# Patient Record
Sex: FEMALE | URBAN - METROPOLITAN AREA
[De-identification: names, ages, dates, MRNs, and addresses within clinical notes are randomized per-mention and may not be internally consistent; named-entity substitution may affect disease eponyms.]

---

## 2019-02-26 ENCOUNTER — Encounter (OUTPATIENT)
Dept: URBAN - METROPOLITAN AREA CLINIC 71 | Facility: CLINIC | Age: 69
End: 2019-02-26
Payer: MEDICARE

## 2019-02-26 PROCEDURE — 92250 FUNDUS PHOTOGRAPHY W/I&R: CPT | Performed by: OPTOMETRIST

## 2019-02-26 PROCEDURE — 99213 OFFICE O/P EST LOW 20 MIN: CPT | Performed by: OPTOMETRIST

## 2019-02-26 PROCEDURE — 92081 LIMITED VISUAL FIELD XM: CPT | Performed by: OPTOMETRIST

## 2020-01-15 ENCOUNTER — Encounter (OUTPATIENT)
Dept: URBAN - METROPOLITAN AREA CLINIC 71 | Facility: CLINIC | Age: 70
End: 2020-01-15
Payer: MEDICARE

## 2020-01-15 PROCEDURE — 99214 OFFICE O/P EST MOD 30 MIN: CPT | Performed by: OPTOMETRIST

## 2020-01-29 ENCOUNTER — Encounter (OUTPATIENT)
Dept: URBAN - METROPOLITAN AREA CLINIC 71 | Facility: CLINIC | Age: 70
End: 2020-01-29

## 2020-01-29 PROCEDURE — 92015 DETERMINE REFRACTIVE STATE: CPT | Performed by: OPTOMETRIST

## 2021-07-19 ENCOUNTER — OFFICE VISIT (OUTPATIENT)
Dept: URBAN - METROPOLITAN AREA CLINIC 71 | Facility: CLINIC | Age: 71
End: 2021-07-19
Payer: MEDICARE

## 2021-07-19 DIAGNOSIS — H53.19 OTHER SUBJECTIVE VISUAL DISTURBANCES: ICD-10-CM

## 2021-07-19 DIAGNOSIS — Z96.1 PRESENCE OF INTRAOCULAR LENS: ICD-10-CM

## 2021-07-19 DIAGNOSIS — H43.813 VITREOUS DEGENERATION, BILATERAL: Primary | ICD-10-CM

## 2021-07-19 DIAGNOSIS — H26.491 OTHER SECONDARY CATARACT, RIGHT EYE: ICD-10-CM

## 2021-07-19 PROCEDURE — 92014 COMPRE OPH EXAM EST PT 1/>: CPT | Performed by: OPTOMETRIST

## 2021-07-19 ASSESSMENT — INTRAOCULAR PRESSURE
OS: 10
OD: 12

## 2021-07-19 NOTE — IMPRESSION/PLAN
Impression: Other subjective visual disturbances: H53.19. Head injury 2 weeks ago w/2 days of dilated OD, resolved. Plan: Discussed in detail with pt. Discussed possible need for neurology consult if any new neurological symptoms noticed. Continue to monitor. Call with concerns.

## 2021-10-29 ENCOUNTER — OFFICE VISIT (OUTPATIENT)
Dept: URBAN - METROPOLITAN AREA CLINIC 71 | Facility: CLINIC | Age: 71
End: 2021-10-29
Payer: COMMERCIAL

## 2021-10-29 DIAGNOSIS — H04.123 DRY EYE SYNDROME OF BILATERAL LACRIMAL GLANDS: ICD-10-CM

## 2021-10-29 DIAGNOSIS — H52.13 MYOPIA, BILATERAL: ICD-10-CM

## 2021-10-29 DIAGNOSIS — H52.4 PRESBYOPIA: Primary | ICD-10-CM

## 2021-10-29 DIAGNOSIS — H52.223 REGULAR ASTIGMATISM, BILATERAL: ICD-10-CM

## 2021-10-29 PROCEDURE — 92014 COMPRE OPH EXAM EST PT 1/>: CPT

## 2021-10-29 ASSESSMENT — INTRAOCULAR PRESSURE
OD: 9
OS: 10

## 2021-10-29 ASSESSMENT — VISUAL ACUITY
OD: 20/25
OS: 20/20

## 2021-10-29 NOTE — IMPRESSION/PLAN
Impression: Presbyopia: H52.4. Unreliable refraction today OD only. Plan: Patient educated on today's findings. Patient given finalized glasses prescription. Patient to RTC if having any issues w/ new glasses.

## 2021-10-29 NOTE — IMPRESSION/PLAN
Impression: Dry eye syndrome of bilateral lacrimal glands: H04.123. Plan: Patient educated on today's findings. Patient advised to use OTC ATs up to QID OU but especially OD d/t intermittent blurry fluctuation in vision.

## 2022-09-16 ENCOUNTER — OFFICE VISIT (OUTPATIENT)
Dept: URBAN - METROPOLITAN AREA CLINIC 71 | Facility: CLINIC | Age: 72
End: 2022-09-16
Payer: MEDICARE

## 2022-09-16 DIAGNOSIS — Z96.1 PRESENCE OF INTRAOCULAR LENS: ICD-10-CM

## 2022-09-16 DIAGNOSIS — H52.4 PRESBYOPIA: ICD-10-CM

## 2022-09-16 DIAGNOSIS — H04.123 DRY EYE SYNDROME OF BILATERAL LACRIMAL GLANDS: ICD-10-CM

## 2022-09-16 DIAGNOSIS — H43.813 VITREOUS DEGENERATION, BILATERAL: Primary | ICD-10-CM

## 2022-09-16 PROCEDURE — 99213 OFFICE O/P EST LOW 20 MIN: CPT

## 2022-09-16 RX ORDER — FLUOROMETHOLONE 1 MG/ML
0.1 % SOLUTION/ DROPS OPHTHALMIC
Qty: 5 | Refills: 0 | Status: ACTIVE
Start: 2022-09-16

## 2022-09-16 ASSESSMENT — VISUAL ACUITY
OD: 20/20
OS: 20/20

## 2022-09-16 ASSESSMENT — INTRAOCULAR PRESSURE
OS: 11
OD: 9

## 2022-09-16 NOTE — IMPRESSION/PLAN
Impression: Dry eye syndrome of bilateral lacrimal glands: H04.123. Plan: Recommend patient continue with AFTs 2-4x daily and will have patient start [id number] [facility] gtts BID OU to help with symptoms. Patient to call if symptoms do not improve.

## 2022-09-16 NOTE — IMPRESSION/PLAN
Impression: Presbyopia: H52.4. - Stressed change in Rx since last exam. Pt voices understanding. Plan: Dispensed new glasses Rx today and discussed changes and possible adaptation period. Patient to call if any issues with new glasses occur.

## 2022-09-16 NOTE — IMPRESSION/PLAN
Impression: Vitreous degeneration, bilateral: H43.813. - Optos preformed: 09/16/2022. Stable. Plan: Will continue to monitor. All signs and risks of retinal detachment and tears were discussed in detail. Patient instructed to call the office immediately if any symptoms noted.

## 2023-09-01 ENCOUNTER — OFFICE VISIT (OUTPATIENT)
Dept: URBAN - METROPOLITAN AREA CLINIC 71 | Facility: CLINIC | Age: 73
End: 2023-09-01
Payer: MEDICARE

## 2023-09-01 DIAGNOSIS — H04.123 DRY EYE SYNDROME OF BILATERAL LACRIMAL GLANDS: ICD-10-CM

## 2023-09-01 DIAGNOSIS — H43.813 VITREOUS DEGENERATION, BILATERAL: Primary | ICD-10-CM

## 2023-09-01 DIAGNOSIS — Z96.1 PRESENCE OF INTRAOCULAR LENS: ICD-10-CM

## 2023-09-01 PROCEDURE — 99213 OFFICE O/P EST LOW 20 MIN: CPT

## 2023-09-01 ASSESSMENT — INTRAOCULAR PRESSURE
OS: 10
OD: 10

## 2023-09-09 ENCOUNTER — TESTING ONLY (OUTPATIENT)
Dept: URBAN - METROPOLITAN AREA CLINIC 71 | Facility: CLINIC | Age: 73
End: 2023-09-09
Payer: MEDICARE

## 2023-09-09 DIAGNOSIS — H52.223 REGULAR ASTIGMATISM, BILATERAL: Primary | ICD-10-CM

## 2023-09-09 DIAGNOSIS — H52.4 PRESBYOPIA: ICD-10-CM

## 2023-09-09 RX ORDER — FLUOROMETHOLONE 1 MG/ML
0.1 % SOLUTION/ DROPS OPHTHALMIC
Qty: 5 | Refills: 0 | Status: ACTIVE
Start: 2023-09-09

## 2023-09-09 ASSESSMENT — INTRAOCULAR PRESSURE
OD: 9
OS: 10

## 2023-09-09 ASSESSMENT — VISUAL ACUITY
OD: 20/20
OS: 20/20

## 2024-05-29 ENCOUNTER — OFFICE VISIT (OUTPATIENT)
Dept: URBAN - METROPOLITAN AREA CLINIC 72 | Facility: CLINIC | Age: 74
End: 2024-05-29
Payer: MEDICARE

## 2024-05-29 DIAGNOSIS — S05.02XA CORNEAL ABRASION W/O FB OF LEFT EYE, INITIAL ENCOUNTER: Primary | ICD-10-CM

## 2024-05-29 PROCEDURE — 92071 CONTACT LENS FITTING FOR TX: CPT

## 2024-05-29 PROCEDURE — 99212 OFFICE O/P EST SF 10 MIN: CPT

## 2024-05-29 ASSESSMENT — INTRAOCULAR PRESSURE
OD: 13
OS: 13

## 2024-06-01 ENCOUNTER — OFFICE VISIT (OUTPATIENT)
Dept: URBAN - METROPOLITAN AREA CLINIC 71 | Facility: CLINIC | Age: 74
End: 2024-06-01
Payer: MEDICARE

## 2024-06-01 DIAGNOSIS — S05.02XS CORNEAL ABRASION W/O FB OF LEFT EYE, SEQUELA: Primary | ICD-10-CM

## 2024-06-01 PROCEDURE — 99213 OFFICE O/P EST LOW 20 MIN: CPT

## 2024-11-07 ENCOUNTER — OFFICE VISIT (OUTPATIENT)
Dept: URBAN - METROPOLITAN AREA CLINIC 71 | Facility: CLINIC | Age: 74
End: 2024-11-07
Payer: MEDICARE

## 2024-11-07 DIAGNOSIS — H43.813 VITREOUS DEGENERATION, BILATERAL: Primary | ICD-10-CM

## 2024-11-07 DIAGNOSIS — H04.123 DRY EYE SYNDROME OF BILATERAL LACRIMAL GLANDS: ICD-10-CM

## 2024-11-07 DIAGNOSIS — Z96.1 PRESENCE OF INTRAOCULAR LENS: ICD-10-CM

## 2024-11-07 PROCEDURE — 92134 CPTRZ OPH DX IMG PST SGM RTA: CPT

## 2024-11-07 PROCEDURE — 99213 OFFICE O/P EST LOW 20 MIN: CPT

## 2024-11-07 RX ORDER — PREDNISOLONE ACETATE 10 MG/ML
1 % SUSPENSION/ DROPS OPHTHALMIC
Qty: 5 | Refills: 0 | Status: ACTIVE
Start: 2024-11-07

## 2024-11-07 ASSESSMENT — INTRAOCULAR PRESSURE
OS: 11
OD: 10

## 2024-11-07 ASSESSMENT — VISUAL ACUITY
OD: 20/25
OS: 20/20

## 2024-11-25 ENCOUNTER — OFFICE VISIT (OUTPATIENT)
Dept: URBAN - METROPOLITAN AREA CLINIC 71 | Facility: CLINIC | Age: 74
End: 2024-11-25
Payer: MEDICARE

## 2024-11-25 DIAGNOSIS — H04.123 DRY EYE SYNDROME OF BILATERAL LACRIMAL GLANDS: Primary | ICD-10-CM

## 2024-11-25 PROCEDURE — 99213 OFFICE O/P EST LOW 20 MIN: CPT

## 2024-11-25 RX ORDER — PREDNISOLONE ACETATE 10 MG/ML
1 % SUSPENSION/ DROPS OPHTHALMIC
Qty: 5 | Refills: 1 | Status: ACTIVE
Start: 2024-11-25

## 2024-11-25 ASSESSMENT — INTRAOCULAR PRESSURE
OD: 9
OS: 10

## 2025-01-17 ENCOUNTER — OFFICE VISIT (OUTPATIENT)
Dept: URBAN - METROPOLITAN AREA CLINIC 71 | Facility: CLINIC | Age: 75
End: 2025-01-17
Payer: MEDICARE

## 2025-01-17 DIAGNOSIS — H04.123 DRY EYE SYNDROME OF BILATERAL LACRIMAL GLANDS: Primary | ICD-10-CM

## 2025-01-17 PROCEDURE — 99213 OFFICE O/P EST LOW 20 MIN: CPT

## 2025-01-17 ASSESSMENT — INTRAOCULAR PRESSURE
OD: 11
OS: 10